# Patient Record
Sex: MALE | Race: WHITE
[De-identification: names, ages, dates, MRNs, and addresses within clinical notes are randomized per-mention and may not be internally consistent; named-entity substitution may affect disease eponyms.]

---

## 2018-01-12 ENCOUNTER — HOSPITAL ENCOUNTER (OUTPATIENT)
Dept: HOSPITAL 89 - RAD | Age: 58
End: 2018-01-12
Attending: NURSE PRACTITIONER
Payer: COMMERCIAL

## 2018-01-12 DIAGNOSIS — R07.81: Primary | ICD-10-CM

## 2018-01-12 DIAGNOSIS — R07.1: ICD-10-CM

## 2018-01-12 PROCEDURE — 71046 X-RAY EXAM CHEST 2 VIEWS: CPT

## 2018-01-12 NOTE — RADIOLOGY IMAGING REPORT
FACILITY: [facility] 

 

PATIENT NAME: Curt Lilly

: 1960

MR: [id number]

V: [id number]

EXAM DATE: 

ORDERING PHYSICIAN: ALINA SANCHEZ

TECHNOLOGIST: 

 

Location: [facility] [place]

Patient: Curt Lilly

: 1960

MRN: [id number]

Visit/Account:[id number]

Date of Sevice:  2018

 

ACCESSION #: [id number]

 

Exam type: CHEST PA AND LAT

 

History: Left rib pain, painful to take deep breaths

 

Comparison: 2013.

 

Findings:

 

The lungs are free of acute effusions, infiltrates or edema.  There is no evidence of pneumothorax or
 pneumomediastinum.  Cardiac silhouette is normal in size.  The trachea is in midline.  There are mil
d spondylotic changes of the thoracic spine.

 

IMPRESSION:

 

1.  No acute cardiopulmonary process is seen

 

Report Dictated By: Maryam Black MD at 2018 3:08 PM

 

Report E-Signed By: Maryam Black MD  at 2018 3:09 PM

 

WSN:[id number]